# Patient Record
Sex: MALE | Race: WHITE | NOT HISPANIC OR LATINO | Employment: OTHER | URBAN - METROPOLITAN AREA
[De-identification: names, ages, dates, MRNs, and addresses within clinical notes are randomized per-mention and may not be internally consistent; named-entity substitution may affect disease eponyms.]

---

## 2021-07-22 ENCOUNTER — HOSPITAL ENCOUNTER (EMERGENCY)
Facility: HOSPITAL | Age: 56
Discharge: LEFT BEFORE DISCHARGE | End: 2021-07-22
Attending: EMERGENCY MEDICINE | Admitting: EMERGENCY MEDICINE

## 2021-07-22 ENCOUNTER — APPOINTMENT (EMERGENCY)
Dept: CT IMAGING | Facility: HOSPITAL | Age: 56
End: 2021-07-22

## 2021-07-22 VITALS
OXYGEN SATURATION: 100 % | RESPIRATION RATE: 18 BRPM | SYSTOLIC BLOOD PRESSURE: 123 MMHG | DIASTOLIC BLOOD PRESSURE: 74 MMHG | WEIGHT: 153.5 LBS | TEMPERATURE: 96.7 F | HEART RATE: 66 BPM

## 2021-07-22 DIAGNOSIS — R10.9 ABDOMINAL PAIN: Primary | ICD-10-CM

## 2021-07-22 DIAGNOSIS — Z76.5 DRUG-SEEKING BEHAVIOR: ICD-10-CM

## 2021-07-22 DIAGNOSIS — K59.00 CONSTIPATION: ICD-10-CM

## 2021-07-22 LAB
ALBUMIN SERPL BCP-MCNC: 3.6 G/DL (ref 3–5.2)
ALP SERPL-CCNC: 57 U/L (ref 43–122)
ALT SERPL W P-5'-P-CCNC: 21 U/L
ANION GAP SERPL CALCULATED.3IONS-SCNC: 6 MMOL/L (ref 5–14)
ANISOCYTOSIS BLD QL SMEAR: PRESENT
AST SERPL W P-5'-P-CCNC: 26 U/L (ref 17–59)
ATRIAL RATE: 63 BPM
ATRIAL RATE: 63 BPM
BILIRUB SERPL-MCNC: 0.49 MG/DL
BUN SERPL-MCNC: 16 MG/DL (ref 5–25)
CALCIUM SERPL-MCNC: 8.3 MG/DL (ref 8.4–10.2)
CHLORIDE SERPL-SCNC: 107 MMOL/L (ref 97–108)
CO2 SERPL-SCNC: 26 MMOL/L (ref 22–30)
CREAT SERPL-MCNC: 0.85 MG/DL (ref 0.7–1.5)
EOSINOPHIL # BLD AUTO: 0.13 THOUSAND/UL (ref 0–0.4)
EOSINOPHIL NFR BLD MANUAL: 5 % (ref 0–6)
ERYTHROCYTE [DISTWIDTH] IN BLOOD BY AUTOMATED COUNT: 20.3 %
GFR SERPL CREATININE-BSD FRML MDRD: 97 ML/MIN/1.73SQ M
GLUCOSE SERPL-MCNC: 102 MG/DL (ref 70–99)
HCT VFR BLD AUTO: 27.1 % (ref 41–53)
HGB BLD-MCNC: 8.6 G/DL (ref 13.5–17.5)
HYPERCHROMIA BLD QL SMEAR: PRESENT
LACTATE SERPL-SCNC: 2.3 MMOL/L (ref 0.7–2)
LIPASE SERPL-CCNC: 93 U/L (ref 23–300)
LYMPHOCYTES # BLD AUTO: 0.43 THOUSAND/UL (ref 0.5–4)
LYMPHOCYTES # BLD AUTO: 17 % (ref 25–45)
MCH RBC QN AUTO: 25.9 PG (ref 26–34)
MCHC RBC AUTO-ENTMCNC: 31.7 G/DL (ref 31–36)
MCV RBC AUTO: 82 FL (ref 80–100)
MONOCYTES # BLD AUTO: 0.35 THOUSAND/UL (ref 0.2–0.9)
MONOCYTES NFR BLD AUTO: 14 % (ref 1–10)
NEUTS SEG # BLD: 1.6 THOUSAND/UL (ref 1.8–7.8)
NEUTS SEG NFR BLD AUTO: 64 %
P AXIS: 67 DEGREES
P AXIS: 69 DEGREES
PLATELET # BLD AUTO: 159 THOUSANDS/UL (ref 150–450)
PLATELET BLD QL SMEAR: ADEQUATE
PMV BLD AUTO: 8.1 FL (ref 8.9–12.7)
POIKILOCYTOSIS BLD QL SMEAR: PRESENT
POTASSIUM SERPL-SCNC: 3.6 MMOL/L (ref 3.6–5)
PR INTERVAL: 140 MS
PR INTERVAL: 140 MS
PROT SERPL-MCNC: 5.9 G/DL (ref 5.9–8.4)
QRS AXIS: 83 DEGREES
QRS AXIS: 87 DEGREES
QRSD INTERVAL: 116 MS
QRSD INTERVAL: 120 MS
QT INTERVAL: 402 MS
QT INTERVAL: 412 MS
QTC INTERVAL: 411 MS
QTC INTERVAL: 421 MS
RBC # BLD AUTO: 3.32 MILLION/UL (ref 4.5–5.9)
RBC MORPH BLD: ABNORMAL
SODIUM SERPL-SCNC: 139 MMOL/L (ref 137–147)
T WAVE AXIS: 49 DEGREES
T WAVE AXIS: 50 DEGREES
TARGETS BLD QL SMEAR: PRESENT
TOTAL CELLS COUNTED SPEC: 100
VENTRICULAR RATE: 63 BPM
VENTRICULAR RATE: 63 BPM
WBC # BLD AUTO: 2.5 THOUSAND/UL (ref 4.5–11)

## 2021-07-22 PROCEDURE — 96374 THER/PROPH/DIAG INJ IV PUSH: CPT

## 2021-07-22 PROCEDURE — 83690 ASSAY OF LIPASE: CPT | Performed by: PHYSICIAN ASSISTANT

## 2021-07-22 PROCEDURE — 96361 HYDRATE IV INFUSION ADD-ON: CPT

## 2021-07-22 PROCEDURE — 85007 BL SMEAR W/DIFF WBC COUNT: CPT | Performed by: PHYSICIAN ASSISTANT

## 2021-07-22 PROCEDURE — 93010 ELECTROCARDIOGRAM REPORT: CPT

## 2021-07-22 PROCEDURE — 99285 EMERGENCY DEPT VISIT HI MDM: CPT | Performed by: PHYSICIAN ASSISTANT

## 2021-07-22 PROCEDURE — 36415 COLL VENOUS BLD VENIPUNCTURE: CPT | Performed by: PHYSICIAN ASSISTANT

## 2021-07-22 PROCEDURE — 87040 BLOOD CULTURE FOR BACTERIA: CPT | Performed by: PHYSICIAN ASSISTANT

## 2021-07-22 PROCEDURE — 74176 CT ABD & PELVIS W/O CONTRAST: CPT

## 2021-07-22 PROCEDURE — 96375 TX/PRO/DX INJ NEW DRUG ADDON: CPT

## 2021-07-22 PROCEDURE — 80053 COMPREHEN METABOLIC PANEL: CPT | Performed by: PHYSICIAN ASSISTANT

## 2021-07-22 PROCEDURE — 99285 EMERGENCY DEPT VISIT HI MDM: CPT

## 2021-07-22 PROCEDURE — 93005 ELECTROCARDIOGRAM TRACING: CPT

## 2021-07-22 PROCEDURE — 83605 ASSAY OF LACTIC ACID: CPT | Performed by: PHYSICIAN ASSISTANT

## 2021-07-22 PROCEDURE — 85027 COMPLETE CBC AUTOMATED: CPT | Performed by: PHYSICIAN ASSISTANT

## 2021-07-22 RX ORDER — HYDROMORPHONE HCL/PF 1 MG/ML
1 SYRINGE (ML) INJECTION ONCE
Status: COMPLETED | OUTPATIENT
Start: 2021-07-22 | End: 2021-07-22

## 2021-07-22 RX ORDER — POTASSIUM CHLORIDE 20 MEQ/1
20 TABLET, EXTENDED RELEASE ORAL 2 TIMES DAILY
COMMUNITY

## 2021-07-22 RX ORDER — ONDANSETRON 2 MG/ML
4 INJECTION INTRAMUSCULAR; INTRAVENOUS ONCE
Status: COMPLETED | OUTPATIENT
Start: 2021-07-22 | End: 2021-07-22

## 2021-07-22 RX ORDER — FERROUS SULFATE 325(65) MG
325 TABLET ORAL
COMMUNITY

## 2021-07-22 RX ORDER — HYDROMORPHONE HCL/PF 1 MG/ML
1 SYRINGE (ML) INJECTION ONCE
Status: DISCONTINUED | OUTPATIENT
Start: 2021-07-22 | End: 2021-07-22 | Stop reason: HOSPADM

## 2021-07-22 RX ADMIN — ONDANSETRON 4 MG: 2 INJECTION INTRAMUSCULAR; INTRAVENOUS at 03:38

## 2021-07-22 RX ADMIN — SODIUM CHLORIDE 1000 ML: 0.9 INJECTION, SOLUTION INTRAVENOUS at 03:36

## 2021-07-22 RX ADMIN — HYDROMORPHONE HYDROCHLORIDE 1 MG: 1 INJECTION, SOLUTION INTRAMUSCULAR; INTRAVENOUS; SUBCUTANEOUS at 03:40

## 2021-07-22 NOTE — ED PROVIDER NOTES
History  Chief Complaint   Patient presents with    Abdominal Pain     vomiting and abd pain x1 day; hx of bowel obstructions and bowel rupture in the past  Vomiting blood; last BM two days ago  Patient presents for an evaluation of abdominal pain, nausea, vomiting worsening x1 hour  Patient reports he has a history of multiple bowel obstructions, multiple abdominal surgeries including bowel resection, and abdominal hernias  States he recently moved up here from [place] where he had all his medical care  Denies any fevers or chills  Denies having a bowel movement or flatus x2 days  Has not tried anything for pain prior to arrival  States he has had multiple episodes of vomiting prior to arrival  He is requesting an NGT be placed upon arrival           Prior to Admission Medications   Prescriptions Last Dose Informant Patient Reported? Taking?   ferrous sulfate 325 (65 Fe) mg tablet  Self Yes Yes   Sig: Take 325 mg by mouth daily with breakfast   potassium chloride (K-DUR,KLOR-CON) 20 mEq tablet  Self Yes Yes   Sig: Take 20 mEq by mouth 2 (two) times a day      Facility-Administered Medications: None       History reviewed  No pertinent past medical history  Past Surgical History:   Procedure Laterality Date    BOWEL RESECTION  2019       History reviewed  No pertinent family history  I have reviewed and agree with the history as documented  E-Cigarette/Vaping     E-Cigarette/Vaping Substances     Social History     Tobacco Use    Smoking status: Never Smoker    Smokeless tobacco: Never Used   Substance Use Topics    Alcohol use: Never    Drug use: Yes     Types: Marijuana       Review of Systems   Constitutional: Negative for chills and fever  HENT: Negative for congestion, ear pain and sore throat  Eyes: Negative for pain  Respiratory: Negative for cough and shortness of breath  Cardiovascular: Negative for chest pain     Gastrointestinal: Positive for abdominal distention, abdominal pain, nausea and vomiting  Genitourinary: Negative for dysuria  Musculoskeletal: Negative for back pain  Skin: Negative for rash  Neurological: Negative for dizziness, weakness and numbness  Psychiatric/Behavioral: Negative for suicidal ideas  All other systems reviewed and are negative  Physical Exam  Physical Exam  Vitals reviewed  Constitutional:       General: He is not in acute distress  Appearance: He is well-developed  He is not diaphoretic  HENT:      Head: Normocephalic and atraumatic  Right Ear: External ear normal       Left Ear: External ear normal       Nose: Nose normal    Eyes:      Pupils: Pupils are equal, round, and reactive to light  Cardiovascular:      Rate and Rhythm: Normal rate and regular rhythm  Heart sounds: Normal heart sounds  Pulmonary:      Effort: Pulmonary effort is normal       Breath sounds: Normal breath sounds  Abdominal:      General: A surgical scar is present  Bowel sounds are absent  There is distension  Palpations: Abdomen is rigid  Tenderness: There is generalized abdominal tenderness  There is guarding  Musculoskeletal:         General: Normal range of motion  Cervical back: Normal range of motion and neck supple  Skin:     General: Skin is warm and dry  Neurological:      Mental Status: He is alert and oriented to person, place, and time           Vital Signs  ED Triage Vitals [07/22/21 0253]   Temperature Pulse Respirations Blood Pressure SpO2   (!) 96 7 °F (35 9 °C) 66 18 123/74 100 %      Temp Source Heart Rate Source Patient Position - Orthostatic VS BP Location FiO2 (%)   Tympanic Monitor Lying Left arm --      Pain Score       Worst Possible Pain           Vitals:    07/22/21 0253   BP: 123/74   Pulse: 66   Patient Position - Orthostatic VS: Lying         Visual Acuity      ED Medications  Medications   HYDROmorphone (DILAUDID) injection 1 mg (has no administration in time range)   sodium chloride 0 9 % bolus 1,000 mL (0 mL Intravenous Stopped 7/22/21 0528)   ondansetron (ZOFRAN) injection 4 mg (4 mg Intravenous Given 7/22/21 0338)   HYDROmorphone (DILAUDID) injection 1 mg (1 mg Intravenous Given 7/22/21 0340)       Diagnostic Studies  Results Reviewed     Procedure Component Value Units Date/Time    Blood culture #2 [[id number]] Collected: 07/22/21 0451    Lab Status:  In process Specimen: Blood from Arm, Right Updated: 07/22/21 0457    Manual Differential (Non Wam) [[id number]]  (Abnormal) Collected: 07/22/21 0336    Lab Status: Final result Specimen: Blood from Line, Venous Updated: 07/22/21 0427     Segmented % 64 %      Lymphocytes % 17 %      Monocytes % 14 %      Eosinophils, % 5 %      Neutrophils Absolute 1 60 Thousand/uL      Lymphocytes Absolute 0 43 Thousand/uL      Monocytes Absolute 0 35 Thousand/uL      Eosinophils Absolute 0 13 Thousand/uL      Total Counted 100     RBC Morphology abnormal     Anisocytosis Present     Hypochromia Present     Poikilocytes Present     Target Cells Present     Platelet Estimate Adequate    Lipase [[id number]]  (Normal) Collected: 07/22/21 0336    Lab Status: Final result Specimen: Blood from Line, Venous Updated: 07/22/21 0413     Lipase 93 u/L     Comprehensive metabolic panel [[id number]]  (Abnormal) Collected: 07/22/21 0336    Lab Status: Final result Specimen: Blood from Line, Venous Updated: 07/22/21 0413     Sodium 139 mmol/L      Potassium 3 6 mmol/L      Chloride 107 mmol/L      CO2 26 mmol/L      ANION GAP 6 mmol/L      BUN 16 mg/dL      Creatinine 0 85 mg/dL      Glucose 102 mg/dL      Calcium 8 3 mg/dL      AST 26 U/L      ALT 21 U/L      Alkaline Phosphatase 57 U/L      Total Protein 5 9 g/dL      Albumin 3 6 g/dL      Total Bilirubin 0 49 mg/dL      eGFR 97 ml/min/1 73sq m     Narrative:      [place] guidelines for Chronic Kidney Disease (CKD):     Stage 1 with normal or high GFR (GFR > 90 mL/min/1 73 square meters)    Stage 2 Mild CKD (GFR = 60-89 mL/min/1 73 square meters)    Stage 3A Moderate CKD (GFR = 45-59 mL/min/1 73 square meters)    Stage 3B Moderate CKD (GFR = 30-44 mL/min/1 73 square meters)    Stage 4 Severe CKD (GFR = 15-29 mL/min/1 73 square meters)    Stage 5 End Stage CKD (GFR <15 mL/min/1 73 square meters)  Note: GFR calculation is accurate only with a steady state creatinine    Lactic acid, plasma [[id number]]  (Abnormal) Collected: 07/22/21 0336    Lab Status: Final result Specimen: Blood from Line, Venous Updated: 07/22/21 0413     LACTIC ACID 2 3 mmol/L     Narrative:      Result may be elevated if tourniquet was used during collection  Lactic acid 2 Hours [[id number]]     Lab Status: No result Specimen: Blood     CBC and differential [[id number]]  (Abnormal) Collected: 07/22/21 0336    Lab Status: Final result Specimen: Blood from Line, Venous Updated: 07/22/21 0407     WBC 2 50 Thousand/uL      RBC 3 32 Million/uL      Hemoglobin 8 6 g/dL      Hematocrit 27 1 %      MCV 82 fL      MCH 25 9 pg      MCHC 31 7 g/dL      RDW 20 3 %      MPV 8 1 fL      Platelets 668 Thousands/uL     Blood culture #1 [[id number]] Collected: 07/22/21 0336    Lab Status: In process Specimen: Blood from Line, Venous Updated: 07/22/21 0358    UA w Reflex to Microscopic w Reflex to Culture [[id number]]     Lab Status: No result Specimen: Urine, Clean Catch                  CT abdomen pelvis wo contrast   Final Result by Kay Guillen DO (07/22 4499)      Extremely limited study due to lack of intravenous and oral contrast       Large amount fecal material within the colon representing constipation  Comparison with prior outside imaging should be obtained  Evaluation of the bowel was extremely limited as described above  If there is a high clinical suspicion for pathology repeat scan with oral contrast can be obtained                 Workstation performed: [id number]                    Procedures  Procedures         ED Course  ED Course as of  0603   Thu 2021   8800 Procedure Note: EKG  Date/Time: 21 3:12 AM   Performed by: Davi Arguelles  Authorized by: Davi Arguelles  ECG interpreted by me, the ED Provider: yes   The EKG demonstrates:  Rate 63  Rhythm normal sinus  QTc 421  No ST elevations/depressions          0345 Patient states he has anaphylaxis to contrast dye  Will order CT abd pelvis wo      0406 NGT placed      0513 Received a call from radiologist - apparently had an identical CT read from several hours prior at [facility] but under a different name and   Also spoke with staff at [place] [address] and was able to describe similar patient and presentation under the name Pierre Pair  He had left AMA when he was refused Dilaudid  When confronted with this information, patient became irate and pulled out his NGT and got up to leave on his own  He was unable to tell us his  when his wrist ID was covered  IV's pulled  Patient eloped with a steady gait  Escorted out by security                                SBIRT 20yo+      Most Recent Value   SBIRT (25 yo +)   In order to provide better care to our patients, we are screening all of our patients for alcohol and drug use  Would it be okay to ask you these screening questions? Yes Filed at: 2021   Initial Alcohol Screen: US AUDIT-C    1  How often do you have a drink containing alcohol?  0 Filed at: 2021   2  How many drinks containing alcohol do you have on a typical day you are drinking? 0 Filed at: 2021   3a  Male UNDER 65: How often do you have five or more drinks on one occasion? 0 Filed at: 2021   Audit-C Score  0 Filed at: 2021   LIZZIE: How many times in the past year have you    Used an illegal drug or used a prescription medication for non-medical reasons?   Never Filed at: 2021                    MDM  Number of Diagnoses or Management Options      Disposition  Final diagnoses:   Abdominal pain Constipation   Drug-seeking behavior     Time reflects when diagnosis was documented in both MDM as applicable and the Disposition within this note     Time User Action Codes Description Comment    7/22/2021  5:38 AM Radha Green Radha Add [R10 9] Abdominal pain     7/22/2021  5:38 AM GreenRadha dang Radha Add [K59 00] Constipation     7/22/2021  5:38 AM GreenRadha dang Radha Add [Z76 5] Drug-seeking behavior       ED Disposition     ED Disposition Condition Date/Time Comment    [place]  Thu Jul 22, 2021  5:38 AM       Follow-up Information    None         Discharge Medication List as of 7/22/2021  5:45 AM      CONTINUE these medications which have NOT CHANGED    Details   ferrous sulfate 325 (65 Fe) mg tablet Take 325 mg by mouth daily with breakfast, Historical Med      potassium chloride (K-DUR,KLOR-CON) 20 mEq tablet Take 20 mEq by mouth 2 (two) times a day, Historical Med           No discharge procedures on file      PDMP Review     None          ED Provider  Electronically Signed by           Mady Caceres PA-C  07/22/21 9863

## 2021-07-22 NOTE — ED NOTES
IV placement attempted multiple times by this RN and provider  Pt reports being a difficult "stick" and needing Ultra sound IV in the past  Successful IV placements achieved  Pt then requested Dilaudid for abdominal pain  Upon presentation, pt appeared with board-like rigid abdomen with self-reported constipation  Reports history of bowel obstructions and history of bowel resections  Pt requested PRN Dilauded multiple times d/t abdominal pain  MD informed that pt was seen at another hospital this evening just prior to arrival and that pt left AMA d/t not getting Dilaudid  Pt escorted out of ER with security after not being able to answer what his correct  is  All invasive lines removed prior to departure        Kody Lloyd RN  21 [id number] Natividad Herrera RN  21 [phone]

## 2021-07-27 LAB
BACTERIA BLD CULT: NORMAL
BACTERIA BLD CULT: NORMAL